# Patient Record
Sex: MALE | Race: WHITE | Employment: OTHER | URBAN - METROPOLITAN AREA
[De-identification: names, ages, dates, MRNs, and addresses within clinical notes are randomized per-mention and may not be internally consistent; named-entity substitution may affect disease eponyms.]

---

## 2017-02-08 ENCOUNTER — OFFICE VISIT (OUTPATIENT)
Dept: ORTHOPEDIC SURGERY | Age: 77
End: 2017-02-08

## 2017-02-08 VITALS
WEIGHT: 149 LBS | DIASTOLIC BLOOD PRESSURE: 71 MMHG | SYSTOLIC BLOOD PRESSURE: 110 MMHG | BODY MASS INDEX: 21.33 KG/M2 | HEIGHT: 70 IN | HEART RATE: 68 BPM

## 2017-02-08 DIAGNOSIS — M17.11 ARTHRITIS OF RIGHT KNEE: Primary | ICD-10-CM

## 2017-02-08 DIAGNOSIS — M25.561 RIGHT KNEE PAIN, UNSPECIFIED CHRONICITY: ICD-10-CM

## 2017-02-08 PROCEDURE — G8419 CALC BMI OUT NRM PARAM NOF/U: HCPCS | Performed by: ORTHOPAEDIC SURGERY

## 2017-02-08 PROCEDURE — 4040F PNEUMOC VAC/ADMIN/RCVD: CPT | Performed by: ORTHOPAEDIC SURGERY

## 2017-02-08 PROCEDURE — 1036F TOBACCO NON-USER: CPT | Performed by: ORTHOPAEDIC SURGERY

## 2017-02-08 PROCEDURE — 99213 OFFICE O/P EST LOW 20 MIN: CPT | Performed by: ORTHOPAEDIC SURGERY

## 2017-02-08 PROCEDURE — G8427 DOCREV CUR MEDS BY ELIG CLIN: HCPCS | Performed by: ORTHOPAEDIC SURGERY

## 2017-02-08 PROCEDURE — G8484 FLU IMMUNIZE NO ADMIN: HCPCS | Performed by: ORTHOPAEDIC SURGERY

## 2017-02-08 PROCEDURE — 3017F COLORECTAL CA SCREEN DOC REV: CPT | Performed by: ORTHOPAEDIC SURGERY

## 2017-02-08 PROCEDURE — 73564 X-RAY EXAM KNEE 4 OR MORE: CPT | Performed by: ORTHOPAEDIC SURGERY

## 2017-02-08 PROCEDURE — 1123F ACP DISCUSS/DSCN MKR DOCD: CPT | Performed by: ORTHOPAEDIC SURGERY

## 2017-02-08 PROCEDURE — 20610 DRAIN/INJ JOINT/BURSA W/O US: CPT | Performed by: ORTHOPAEDIC SURGERY

## 2017-03-09 ENCOUNTER — HOSPITAL ENCOUNTER (OUTPATIENT)
Dept: PHYSICAL THERAPY | Age: 77
Discharge: HOME | End: 2017-03-31
Admitting: ORTHOPAEDIC SURGERY

## 2017-03-27 ENCOUNTER — HOSPITAL ENCOUNTER (OUTPATIENT)
Dept: PHYSICAL THERAPY | Age: 77
Discharge: HOME | End: 2017-03-27
Admitting: ORTHOPAEDIC SURGERY

## 2017-04-05 ENCOUNTER — HOSPITAL ENCOUNTER (OUTPATIENT)
Dept: PHYSICAL THERAPY | Age: 77
Discharge: HOME | End: 2017-04-05
Admitting: ORTHOPAEDIC SURGERY

## 2017-11-07 ENCOUNTER — OFFICE VISIT (OUTPATIENT)
Dept: ORTHOPEDIC SURGERY | Age: 77
End: 2017-11-07

## 2017-11-07 VITALS
BODY MASS INDEX: 21.34 KG/M2 | HEART RATE: 76 BPM | WEIGHT: 149.03 LBS | DIASTOLIC BLOOD PRESSURE: 67 MMHG | SYSTOLIC BLOOD PRESSURE: 96 MMHG | HEIGHT: 70 IN

## 2017-11-07 DIAGNOSIS — M79.672 LEFT FOOT PAIN: Primary | ICD-10-CM

## 2017-11-07 PROCEDURE — G8420 CALC BMI NORM PARAMETERS: HCPCS | Performed by: ORTHOPAEDIC SURGERY

## 2017-11-07 PROCEDURE — 4040F PNEUMOC VAC/ADMIN/RCVD: CPT | Performed by: ORTHOPAEDIC SURGERY

## 2017-11-07 PROCEDURE — 3017F COLORECTAL CA SCREEN DOC REV: CPT | Performed by: ORTHOPAEDIC SURGERY

## 2017-11-07 PROCEDURE — G8427 DOCREV CUR MEDS BY ELIG CLIN: HCPCS | Performed by: ORTHOPAEDIC SURGERY

## 2017-11-07 PROCEDURE — 1123F ACP DISCUSS/DSCN MKR DOCD: CPT | Performed by: ORTHOPAEDIC SURGERY

## 2017-11-07 PROCEDURE — 1036F TOBACCO NON-USER: CPT | Performed by: ORTHOPAEDIC SURGERY

## 2017-11-07 PROCEDURE — G8484 FLU IMMUNIZE NO ADMIN: HCPCS | Performed by: ORTHOPAEDIC SURGERY

## 2017-11-07 PROCEDURE — 99213 OFFICE O/P EST LOW 20 MIN: CPT | Performed by: ORTHOPAEDIC SURGERY

## 2018-01-26 ENCOUNTER — OFFICE VISIT (OUTPATIENT)
Dept: ORTHOPEDIC SURGERY | Age: 78
End: 2018-01-26

## 2018-01-26 VITALS
SYSTOLIC BLOOD PRESSURE: 110 MMHG | WEIGHT: 149.03 LBS | HEART RATE: 80 BPM | BODY MASS INDEX: 21.34 KG/M2 | DIASTOLIC BLOOD PRESSURE: 62 MMHG | HEIGHT: 70 IN

## 2018-01-26 DIAGNOSIS — M25.511 RIGHT SHOULDER PAIN, UNSPECIFIED CHRONICITY: Primary | ICD-10-CM

## 2018-01-26 PROCEDURE — 3017F COLORECTAL CA SCREEN DOC REV: CPT | Performed by: ORTHOPAEDIC SURGERY

## 2018-01-26 PROCEDURE — 4040F PNEUMOC VAC/ADMIN/RCVD: CPT | Performed by: ORTHOPAEDIC SURGERY

## 2018-01-26 PROCEDURE — 1123F ACP DISCUSS/DSCN MKR DOCD: CPT | Performed by: ORTHOPAEDIC SURGERY

## 2018-01-26 PROCEDURE — 1036F TOBACCO NON-USER: CPT | Performed by: ORTHOPAEDIC SURGERY

## 2018-01-26 PROCEDURE — G8484 FLU IMMUNIZE NO ADMIN: HCPCS | Performed by: ORTHOPAEDIC SURGERY

## 2018-01-26 PROCEDURE — 99214 OFFICE O/P EST MOD 30 MIN: CPT | Performed by: ORTHOPAEDIC SURGERY

## 2018-01-26 PROCEDURE — G8420 CALC BMI NORM PARAMETERS: HCPCS | Performed by: ORTHOPAEDIC SURGERY

## 2018-01-26 PROCEDURE — G8427 DOCREV CUR MEDS BY ELIG CLIN: HCPCS | Performed by: ORTHOPAEDIC SURGERY

## 2018-01-26 NOTE — PROGRESS NOTES
Chief Complaint  New Patient (Right Shoulder pain x 1 month. No TERRENCE.)      History of Present Illness:  Heath Tena is a 78 y.o. y/o male who presents today for evaluation of his right shoulder. For approximately 1 month he's noted right shoulder pain in the lateral aspect of his arm radiates down towards his elbow. He denies numbness and tingling. He does have night pain. It is worse with any lifting or motion activities. He denies any one single injury. He denies any previous surgery, injections, therapy.       Occupation/activities: Retired    Medical History  Past Medical History:   Diagnosis Date    Anemia     Arthritis     Benign neoplasm of colon 1993    Tubular adenoma with CIS    [facility]chronic obstructive [facility] disease) ([facility] [address]     Diverticulosis of colon (without mention of hemorrhage)     Esophageal cancer ([facility] [address] 1997    esophageal resection/chemo/radiation    Gastric ulcer, unspecified as acute or chronic, without mention of hemorrhage, perforation, or obstruction 2003    Hyperlipidemia     Hypotension     Lung cancer ([facility] [address] 06/11/2009    Osteoporosis, idiopathic 1997    Parkinson disease ([facility] [address]     Reflux     S/P spinal fusion        Past Surgical History:   Procedure Laterality Date    BACK SURGERY  2011    \"lower back\"    BONE MARROW BIOPSY  2000    COLECTOMY  1993    COLONOSCOPY  04/20/1993    TA wCIS & diverticulosis    COLONOSCOPY  04/11/1994    Diverticulosis    COLONOSCOPY  04/18/1997    Diverticulosis    COLONOSCOPY  08/01/2000    Colonoscopy    COLONOSCOPY  10/18/2005    diverticulosis    COLONOSCOPY  4/2/13    polyp    ESOPHAGECTOMY  10/31/1997    after preop radiation & chemo    FINE NEEDLE ASPIRATION      HERNIA REPAIR Right 2005    LIVER BIOPSY  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/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    LUNG REMOVAL, PARTIAL Right 2009    THORACOTOMY Right 06/11/2009    Squamous cell carcinoma    TONSILLECTOMY  childhood    TUNNELED VENOUS PORT PLACEMENT  1997    UPPER GASTROINTESTINAL ENDOSCOPY  06/24/1997    Ulcerative esophagitis w Montoya's w CIS    UPPER GASTROINTESTINAL ENDOSCOPY  02/19/1998    No residual carcinoma    UPPER GASTROINTESTINAL ENDOSCOPY  04/27/1998    Delayed gastric emptying    UPPER GASTROINTESTINAL ENDOSCOPY  08/24/1999    No residual carcinoma    UPPER GASTROINTESTINAL ENDOSCOPY  08/01/2000    No residual carcinoma    UPPER GASTROINTESTINAL ENDOSCOPY  11/29/2001    No residual carcinoma    UPPER GASTROINTESTINAL ENDOSCOPY  04/12/2002    Dilate stenotic anastomosis    UPPER GASTROINTESTINAL ENDOSCOPY  09/17/2003    Dilate stenotic anastomosis    UPPER GASTROINTESTINAL ENDOSCOPY  11/11/2003    Antral ulcer    UPPER GASTROINTESTINAL ENDOSCOPY  05/17/2005    No residual carcinoma    UPPER GASTROINTESTINAL ENDOSCOPY  10/18/2005    No residual carcinoma    UPPER GASTROINTESTINAL ENDOSCOPY  09/13/2007    Invasive Candidiasis    UPPER GASTROINTESTINAL ENDOSCOPY  04/07/2009    No residual carcinoma    UPPER GASTROINTESTINAL ENDOSCOPY  02/11/2013    No dysplasia    UPPER GASTROINTESTINAL ENDOSCOPY  4/2/13    bx    UPPER GASTROINTESTINAL ENDOSCOPY  9/15/15       Social History     Social History    Marital status:      Spouse name: N/A    Number of children: N/A    Years of education: N/A     Occupational History    Not on file.      Social History Main Topics    Smoking status: Former Smoker     Packs/day: 1.00     Years: 40.00     Quit date: 2/11/2009    Smokeless tobacco: Never Used    Alcohol use No    Drug use: No    Sexual activity: Not on file     Other Topics Concern    Not on file     Social History Narrative    No narrative on file       Family History   Problem Relation Age of Onset    Cancer Mother 76     liver, colon    High Cholesterol Mother     Colon Polyps Father     High Cholesterol Father     Heart Disease Father         Review of Systems  Pertinent items are noted in HPI  Review of systems reviewed from Patient History Form